# Patient Record
Sex: FEMALE | Race: BLACK OR AFRICAN AMERICAN | Employment: UNEMPLOYED | URBAN - METROPOLITAN AREA
[De-identification: names, ages, dates, MRNs, and addresses within clinical notes are randomized per-mention and may not be internally consistent; named-entity substitution may affect disease eponyms.]

---

## 2018-10-26 ENCOUNTER — APPOINTMENT (OUTPATIENT)
Dept: GENERAL RADIOLOGY | Age: 78
End: 2018-10-26
Attending: EMERGENCY MEDICINE
Payer: COMMERCIAL

## 2018-10-26 ENCOUNTER — HOSPITAL ENCOUNTER (EMERGENCY)
Age: 78
Discharge: HOME | End: 2018-10-26
Attending: EMERGENCY MEDICINE
Payer: COMMERCIAL

## 2018-10-26 VITALS
WEIGHT: 174 LBS | SYSTOLIC BLOOD PRESSURE: 137 MMHG | DIASTOLIC BLOOD PRESSURE: 83 MMHG | HEART RATE: 69 BPM | BODY MASS INDEX: 28.99 KG/M2 | RESPIRATION RATE: 16 BRPM | TEMPERATURE: 97.7 F | OXYGEN SATURATION: 100 % | HEIGHT: 65 IN

## 2018-10-26 DIAGNOSIS — S96.912A MUSCLE STRAIN OF LEFT FOOT, INITIAL ENCOUNTER: ICD-10-CM

## 2018-10-26 DIAGNOSIS — M79.672 ACUTE FOOT PAIN, LEFT: Primary | ICD-10-CM

## 2018-10-26 PROCEDURE — 99282 EMERGENCY DEPT VISIT SF MDM: CPT

## 2018-10-26 PROCEDURE — 73630 X-RAY EXAM OF FOOT: CPT

## 2018-10-26 PROCEDURE — 74011250636 HC RX REV CODE- 250/636: Performed by: EMERGENCY MEDICINE

## 2018-10-26 PROCEDURE — 96372 THER/PROPH/DIAG INJ SC/IM: CPT

## 2018-10-26 RX ORDER — ROPINIROLE 4 MG/1
TABLET, FILM COATED ORAL
COMMUNITY

## 2018-10-26 RX ORDER — OXYCODONE HYDROCHLORIDE 5 MG/1
5 TABLET ORAL
COMMUNITY

## 2018-10-26 RX ORDER — TRIAMTERENE AND HYDROCHLOROTHIAZIDE 37.5; 25 MG/1; MG/1
CAPSULE ORAL DAILY
COMMUNITY

## 2018-10-26 RX ORDER — KETOROLAC TROMETHAMINE 30 MG/ML
15 INJECTION, SOLUTION INTRAMUSCULAR; INTRAVENOUS
Status: COMPLETED | OUTPATIENT
Start: 2018-10-26 | End: 2018-10-26

## 2018-10-26 RX ADMIN — KETOROLAC TROMETHAMINE 15 MG: 30 INJECTION, SOLUTION INTRAMUSCULAR at 12:11

## 2018-10-26 NOTE — PROGRESS NOTES
conducted an initial consultation and Spiritual Assessment for Bruno Ramirez, who is a 78 y.o.,female. Patients Primary Language is: [place]. According to the patients EMR Cheondoism Affiliation is: Unknown. The reason the Patient came to the hospital is: There are no active problems to display for this patient. The  provided the following Interventions: 
Initiated a relationship of care and support. Explored issues of fiona, belief, spirituality and Congregational/ritual needs while hospitalized. Listened empathically. Provided chaplaincy education. Provided information about Spiritual Care Services. Offered prayer and assurance of continued prayers on patient's behalf. Chart reviewed. The following outcomes where achieved: 
Patient shared limited information about both her medical narrative and spiritual journey/beliefs.  confirmed Patient's Cheondoism Affiliation with [facility] . Patient processed feeling about current hospitalization. Patient expressed gratitude for 's visit. Assessment: 
Patient does not have any Congregational/cultural needs that will affect patients preferences in health care. There are no spiritual or Congregational issues which require intervention at this time. Plan: 
Chaplains will continue to follow and will provide pastoral care on an as needed/requested basis.  recommends bedside caregivers page  on duty if patient shows signs of acute spiritual or emotional distress. Chaplain Resident Altagracia Romo Spiritual Care  
[phone]

## 2018-10-26 NOTE — ED NOTES
Leonard Sarah is a 78 y.o. female that was discharged in stable condition. The patients diagnosis, condition and treatment were explained to  patient and aftercare instructions were given. The patient verbalized understanding. Patient armband removed and shredded.

## 2018-10-26 NOTE — DISCHARGE INSTRUCTIONS
Foot Pain: Care Instructions  Your Care Instructions  Foot injuries that cause pain and swelling are fairly common. Almost all sports or home repair projects can cause a misstep that ends up as foot pain. Normal wear and tear, especially as you get older, also can cause foot pain. Most minor foot injuries will heal on their own, and home treatment is usually all you need to do. If you have a severe injury, you may need tests and treatment. Follow-up care is a key part of your treatment and safety. Be sure to make and go to all appointments, and call your doctor if you are having problems. It's also a good idea to know your test results and keep a list of the medicines you take. How can you care for yourself at home? · Take pain medicines exactly as directed. ? If the doctor gave you a prescription medicine for pain, take it as prescribed. ? If you are not taking a prescription pain medicine, ask your doctor if you can take an over-the-counter medicine. · Rest and protect your foot. Take a break from any activity that may cause pain. · Put ice or a cold pack on your foot for 10 to 20 minutes at a time. Put a thin cloth between the ice and your skin. · Prop up the sore foot on a pillow when you ice it or anytime you sit or lie down during the next 3 days. Try to keep it above the level of your heart. This will help reduce swelling. · Your doctor may recommend that you wrap your foot with an elastic bandage. Keep your foot wrapped for as long as your doctor advises. · If your doctor recommends crutches, use them as directed. · Wear roomy footwear. · As soon as pain and swelling end, begin gentle exercises of your foot. Your doctor can tell you which exercises will help. When should you call for help? Call 911 anytime you think you may need emergency care.  For example, call if:    · Your foot turns pale, white, blue, or cold.    Call your doctor now or seek immediate medical care if:    · You cannot move or stand on your foot.     · Your foot looks twisted or out of its normal position.     · Your foot is not stable when you step down.     · You have signs of infection, such as:  ? Increased pain, swelling, warmth, or redness. ? Red streaks leading from the sore area. ? Pus draining from a place on your foot. ? A fever.     · Your foot is numb or tingly.    Watch closely for changes in your health, and be sure to contact your doctor if:    · You do not get better as expected.     · You have bruises from an injury that last longer than 2 weeks. Where can you learn more? Go to http://hcriss-clark.info/. Enter K646 in the search box to learn more about \"Foot Pain: Care Instructions. \"  Current as of: November 29, 2017  Content Version: 11.8  © 1643-9595 [employer]. Care instructions adapted under license by [employer] (which disclaims liability or warranty for this information). If you have questions about a medical condition or this instruction, always ask your healthcare professional. [address] any warranty or liability for your use of this information.

## 2018-10-26 NOTE — ED PROVIDER NOTES
EMERGENCY DEPARTMENT HISTORY AND PHYSICAL EXAM 
 
11:01 AM 
 
 
Date: 10/26/2018 Patient Name: Crow Boateng History of Presenting Illness Chief Complaint Patient presents with  Fall  Toe Pain History Provided By: Patient Chief Complaint: Toe Pain Duration: 5 Days Timing:  Acute Location: Left 5th toe Quality: Pat[place] Rakes Severity: 6 out of 10 Modifying Factors: Took Percocet and temporarily relieved pain Associated Symptoms: denies any other associated signs or symptoms Additional History (Context):11:41 AM Crow Boateng is a 78 y.o. female with h/o breast cancer presents to ED complaining of left 5th toe pain beginning 5 days ago following a mechanical fall. Pt describes pain as sharp and 6/10 in severity. She claims it is \"hard to walk on it\" and that \"it hurts when wiggling\". She took Percocet x 2 hours ago this morning, and sx were temporarily relieved. Pt reports previous knee surgery in June and that she is allergic to Penicillin. She denies any other injury, concerns or symptoms at this time. PCP: None Past History Past Medical History: 
Past Medical History:  
Diagnosis Date  Cancer [facility])   
 left breast  
 Gastrointestinal disorder GERD Past Surgical History: 
Past Surgical History:  
Procedure Laterality Date  ABDOMEN SURGERY PROC UNLISTED    
 liver CT guided cyst drain 2012  BREAST SURGERY PROCEDURE UNLISTED    
 left breast mastectomy 85  
 HX GYN    
 hystectomy 88  
 HX HEENT    
 tonsilectomy 87, glacoma laser 2012  HX ORTHOPAEDIC    
 right arem rotator cuff repair 2010, rt. hand carpal tunnel release  HX ORTHOPAEDIC    
 right knee replacemtn 2018  VASCULAR SURGERY PROCEDURE UNLIST    
 STENT TO RIGHT LEG VEIN Family History: No family history on file. Social History: 
Social History Tobacco Use  Smoking status: Not on file Substance Use Topics  Alcohol use: Not on file  Drug use: Not on file Allergies: Allergies Allergen Reactions  Penicillins Rash Review of Systems Review of Systems Constitutional: Negative for fever. Respiratory: Negative for shortness of breath. Gastrointestinal: Negative for nausea. Genitourinary: Negative for dysuria. Musculoskeletal: Negative for neck pain and neck stiffness. Positive for toe pain Skin: Negative for pallor. Neurological: Negative for dizziness and numbness. Hematological: Does not bruise/bleed easily. All other systems reviewed and are negative. Physical Exam  
 
Visit Vitals /83 (BP 1 Location: Right arm) Pulse 69 Temp 97.7 °F (36.5 °C) Resp 16 Ht 5' 5\" (1.651 m) Wt 78.9 kg (174 lb) SpO2 100% BMI 28.96 kg/m² Physical Exam  
Constitutional: She is oriented to person, place, and time. She appears well-developed and well-nourished. No distress. HENT:  
Head: Normocephalic and atraumatic. Mouth/Throat: Oropharynx is clear and moist.  
Eyes: Conjunctivae and EOM are normal. No scleral icterus. Neck: Normal range of motion. Neck supple. Cardiovascular: Normal rate and intact distal pulses. Pulses: 
     Dorsalis pedis pulses are 2+ on the right side. Capillary refill < 3 seconds Good dorsal pulses Pulmonary/Chest: Effort normal. No respiratory distress. Musculoskeletal: Normal range of motion. She exhibits tenderness. She exhibits no edema or deformity. Left foot: There is tenderness. Left fifth toe tenderness Fifth metatarsal tenderness No obvious deformity in left foot Neurological: She is alert and oriented to person, place, and time. No cranial nerve deficit. She exhibits normal muscle tone. Coordination normal.  
Sensation intact Skin: Skin is warm and dry. No rash noted. She is not diaphoretic. No erythema. No open wound Psychiatric: Thought content normal.  
Nursing note and vitals reviewed. Diagnostic Study Results Labs - No results found for this or any previous visit (from the past 12 hour(s)). Radiologic Studies -  
XR FOOT LT MIN 3 V Final Result Impression:  
 IMPRESSION: 
1.  No acute fracture or dislocation. 2.  Mild to moderate soft tissue swelling in the lateral aspect of the forefoot. 3.  Generalized osteopenia. 4.  Degenerative changes as above. Medical Decision Making I am the first provider for this patient. I reviewed the vital signs, available nursing notes, past medical history, past surgical history, family history and social history. Vital Signs-Reviewed the patient's vital signs. Pulse Oximetry Analysis -  100% on room air Records Reviewed: Nursing Notes and Old Medical Records (Time of Review: 11:01 AM) Provider Notes (Medical Decision Making): MDM Number of Diagnoses or Management Options Acute foot pain, left:  
Muscle strain of left foot, initial encounter:  
Diagnosis management comments: Ddx: Fracture, contusion, strain. x-ray and give pain medicine. Amount and/or Complexity of Data Reviewed Tests in the radiology section of CPT®: ordered and reviewed Tests in the medicine section of CPT®: ordered and reviewed Medications  
ketorolac (TORADOL) injection 15 mg (15 mg IntraMUSCular Given 10/26/18 1211) ED Course: Progress Notes, Reevaluation, and Consults: 
1:04 PM 
No fracture, NV intact pre and post cast shoe. Pt is taking Oxycodone at home. F/U pcp in [facility], may need podiatry referral if not getting better in few days. I discussed this with her. She agrees an understands, she will discuss with her pcp. I have reassessed the patient. I have discussed the workup, results and plan with the patient and patient is in agreement. Patient has no new complaint. .  Patient was discharge in stable condition. Patient was given outpatient follow up.   Patient is to return to emergency department if any new or worsening condition. Diagnosis Clinical Impression: 1. Acute foot pain, left 2. Muscle strain of left foot, initial encounter Disposition: discharged. Follow-up Information Follow up With Specialties Details Why Contact Info Your PCP   Schedule an appointment as soon as possible for a visit in 2 days For Follow-Up [address] [facility] EMERGENCY DEPT Emergency Medicine Go to As needed, If symptoms worsen [id number] [facility] [place] [address] [phone] Medication List  
  
ASK your doctor about these medications   
oxyCODONE IR 5 mg immediate release tablet Commonly known as:  ROXICODONE 
  
rOPINIRole 4 mg Tab TAB Commonly known as:  REQUIP 
  
triamterene-hydroCHLOROthiazide 37.5-25 mg per capsule Commonly known as:  Edyta Barker 
  
  
 
_______________________________ Attestations: 
Scribe Attestation [employer] acting as a scribe for and in the presence of Guido Molina MontevalloDO October 26, 2018 at 12:07 PM 
    
Provider Attestation:     
I personally performed the services described in the documentation, reviewed the documentation, as recorded by the scribe in my presence, and it accurately and completely records my words and actions. October 26, 2018 at 12:07 PM - Myriam Gamboa DO   
_______________________________

## 2021-04-09 ENCOUNTER — HOSPITAL ENCOUNTER (EMERGENCY)
Age: 81
Discharge: HOME | End: 2021-04-09
Attending: EMERGENCY MEDICINE
Payer: MEDICARE

## 2021-04-09 ENCOUNTER — APPOINTMENT (OUTPATIENT)
Dept: GENERAL RADIOLOGY | Age: 81
End: 2021-04-09
Attending: EMERGENCY MEDICINE
Payer: MEDICARE

## 2021-04-09 VITALS
HEIGHT: 62 IN | OXYGEN SATURATION: 100 % | DIASTOLIC BLOOD PRESSURE: 90 MMHG | TEMPERATURE: 98.6 F | SYSTOLIC BLOOD PRESSURE: 137 MMHG | WEIGHT: 178 LBS | HEART RATE: 84 BPM | BODY MASS INDEX: 32.76 KG/M2 | RESPIRATION RATE: 17 BRPM

## 2021-04-09 DIAGNOSIS — S80.02XA CONTUSION OF LEFT KNEE, INITIAL ENCOUNTER: Primary | ICD-10-CM
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
## 2021-04-09 PROCEDURE — 99283 EMERGENCY DEPT VISIT LOW MDM: CPT

## 2021-04-09 PROCEDURE — 73564 X-RAY EXAM KNEE 4 OR MORE: CPT

## 2021-04-09 NOTE — ED PROVIDER NOTES
[facility] EMERGENCY DEPT    Date: 4/9/2021  Patient Name: Courtney Rodriguez    History of Presenting Illness     Chief Complaint   Patient presents with    Knee Pain     68 y.o. female with noted past medical history presents the ED complaining of a trip and fall last evening with left knee pain. Patient reports attempting to walk up the steps, when she missed a step, falling on her left knee. She states she did not have any other injury. Notes having constant moderate pain and swelling to her left knee. She has not taken anything for symptoms today. Denies any head injury, LOC, neck pain, back pain, numbness/weakness, or any other symptoms at this time. Patient denies any other associated signs or symptoms. Patient denies any other complaints. Nursing notes regarding the HPI and triage nursing notes were reviewed. Prior medical records were reviewed. Current Outpatient Medications   Medication Sig Dispense Refill    triamterene-hydroCHLOROthiazide (DYAZIDE) 37.5-25 mg per capsule Take  by mouth daily.  rOPINIRole (REQUIP) 4 mg tab TAB Take  by mouth.  oxyCODONE IR (ROXICODONE) 5 mg immediate release tablet Take 5 mg by mouth every four (4) hours as needed for Pain.          Past History     Past Medical History:  Past Medical History:   Diagnosis Date    Cancer ([address]     left breast    Gastrointestinal disorder     GERD       Past Surgical History:  Past Surgical History:   Procedure Laterality Date    HX GYN      hystectomy 82    HX HEENT      tonsilectomy 87, glacoma laser 2012    HX ORTHOPAEDIC      right arem rotator cuff repair 2010, rt. hand carpal tunnel release    HX ORTHOPAEDIC      right knee replacemtn 2018    GA ABDOMEN SURGERY PROC UNLISTED      liver CT guided cyst drain 2012    GA BREAST SURGERY PROCEDURE UNLISTED      left breast mastectomy 85    VASCULAR SURGERY PROCEDURE UNLIST      STENT TO RIGHT LEG VEIN       Family History:  History reviewed. No pertinent family history. Social History:  Social History     Tobacco Use    Smoking status: Never Smoker    Smokeless tobacco: Never Used   Substance Use Topics    Alcohol use: Not on file    Drug use: Not on file       Allergies: Allergies   Allergen Reactions    Penicillins Rash       Patient's primary care provider (as noted in EPIC):  None    Review of Systems   Constitutional:  Denies malaise, fever, chills. Head:  Denies injury. Face:  Denies injury or pain. Neck:  Denies injury or pain. Chest:  Denies injury. Cardiac:  Denies chest pain  Respiratory:  Denies shortness of breath. GI/ABD:  Denies abd pain. Back:  Denies injury or pain. Pelvis:  Denies injury or pain. Extremity/MS: + left knee pain/swelling. Neuro:  Denies headache, LOC, dizziness, neurologic symptoms/deficits/paresthesias. Skin: Denies injury, rash, itching or skin changes. All other systems negative as reviewed. Visit Vitals  BP (!) 137/90 (BP 1 Location: Left upper arm, BP Patient Position: At rest)   Pulse 84   Temp 98.6 °F (37 °C)   Resp 17   Ht 5' 2\" (1.575 m)   Wt 80.7 kg (178 lb)   SpO2 100%   BMI 32.56 kg/m²       PHYSICAL EXAM:    CONSTITUTIONAL: Alert, in no apparent distress; well-developed; well-nourished. HEAD:  Normocephalic, atraumatic. No Battles sign. No Raccoons eyes. EYES:  EOM's intact. Normal conjunctiva. Anicteric sclera. ENTM: Nose: no rhinorrhea; Oropharynx:  mucous membranes moist  Neck:  Supple  RESP: Chest clear, equal breath sounds. CARDIOVASCULAR:  Regular rate and rhythm. No murmurs, rubs, or gallops. BACK:  No TLS vertebral bony point tenderness or step-off. UPPER EXT:  Normal inspection  LOWER EXT: Left knee with edema and mild TTP, pain with ROM; NVI distally with 5/5 strength. No laxity or pain on ligamentous testing. NEURO: Grossly normal motor and sensation. SKIN: No rashes; Normal for age and stage.   PSYCH:  Alert and oriented, normal affect. DIFFERENTIAL DIAGNOSES/ MEDICAL DECISION MAKING:  Contusion, hematoma, muscle strain/ sprain, ligamentous strain/ sprain, ligamentous tear/ disruption or a combination of the above. ED COURSE:      Xr Knee Lt Min 4 V    Result Date: 4/9/2021  History: Pain status post fall. COMPARISON: None. TECHNIQUE: 4 views left knee. Findings/impression: Decreased bone mineral density. Moderate tricompartment osteoarthritis. No definite acute fracture given limitations. No large effusion. Question mild patellar tendon thickening. IMPRESSION AND MEDICAL DECISION MAKING:  Pt has a contusion of her left knee. No other injury from the fall. She will RICE, take tylenol at home and f/u with ortho. Diagnosis:   1. Contusion of left knee, initial encounter    2. Fall, initial encounter      Dispo: Discharge    Follow-up Information     Follow up With Specialties Details Why [address] [facility] Surgery In 3 days  [address] [address]  [phone] [address] [place] [address] EMERGENCY DEPT Emergency Medicine  If symptoms worsen [address] [place] [address]  [phone]          Discharge Medication List as of 4/9/2021 12:55 PM      CONTINUE these medications which have NOT CHANGED    Details   triamterene-hydroCHLOROthiazide (DYAZIDE) 37.5-25 mg per capsule Take  by mouth daily. , Historical Med      rOPINIRole (REQUIP) 4 mg tab TAB Take  by mouth., Historical Med      oxyCODONE IR (ROXICODONE) 5 mg immediate release tablet Take 5 mg by mouth every four (4) hours as needed for Pain., Historical Med           YARY Rao

## 2025-05-01 ENCOUNTER — HOSPITAL ENCOUNTER (OUTPATIENT)
Facility: HOSPITAL | Age: 85
Setting detail: RECURRING SERIES
Discharge: HOME | End: 2025-05-04
Payer: MEDICARE

## 2025-05-01 PROCEDURE — 97161 PT EVAL LOW COMPLEX 20 MIN: CPT

## 2025-05-01 NOTE — PROGRESS NOTES
PT/OT DAILY TREATMENT NOTE/SHOULDER EVAL 10-18      Patient Name: Daja Pearl    Date: 2025    : 1940  Insurance: Payor: HU[employer]NA MEDICARE / Plan: HU[employer]NA CHOICE-PPO MEDICARE / Product Type: *No Product type* /      Patient  verified no     Visit #   Current / Total 1 36   Time   In / Out 230 310     TREATMENT AREA =  Other specified postprocedural states [Z98.890]      SUBJECTIVE  Pain Level (0-10 scale): /10  []constant []intermittent []improving []worsening []no change since onset    Any medication changes, allergies to medications, adverse drug reactions, diagnosis change, or new procedure performed?: [x] No    [] Yes   Subjective functional status/changes:     PLOF: functionally independent, lives independently, gardens, cooks, and fishes.   Limitations to PLOF: Pt currently unable to use left arm for any AROM, in sling, required assist for dressing and all IADLS.   Mechanism of Injury: 25 Left RTC repair with Subacromial decompression    Current symptoms/Complaints: 1/10 at best with rest; 7/10 at worst with movement; pt reports they are unable to sleep through the night secondary to pain  Previous Treatment/Compliance: NA  PMHx/Surgical Hx: Breast Cancer with lumpectomy and lumpectomy, removal OA, HBP, Right RTC repair >10 years ago, hx of DVT.   Work Hx: Retired   Living Situation: Live alone; staying with daughter to recover.   Pt Goals: \"Pt get better, use arm like normal\"  Barriers: []pain []financial []time []transportation []other  Motivation: Good  Substance use: []Alcohol []Tobacco []other:   Cognition: A & O x 3        OBJECTIVE      16 min []Eval - untimed                          Therapeutic Procedures:  Tx Min Billable or 1:1 Min (if diff from Tx Min) Procedure, Rationale, Specifics   7  [id number] Therapeutic Exercise (timed):  increase ROM, strength, coordination, balance, and proprioception to improve patient's ability to progress to PLOF and address remaining functional

## 2025-05-01 NOTE — PROGRESS NOTES
[facility]  [address] [facility] [address] [place] [address] Ph:[phone] Fx: [phone]    PLAN OF CARE/ Statement of Necessity for Physical Therapy Services           Patient name: Daja Pearl Start of Care: 2025   Referral source: Rafael Sheikh MD : 1940    Medical Diagnosis: Other specified postprocedural states Onset Date: 25   Treatment Diagnosis: M25.512  LEFT SHOULDER PAIN                                     Prior Hospitalization: see medical history Provider#: [id number]     Comorbidities:  Breast Cancer with lumpectomy and lumpectomy, removal OA, HBP, Right RTC repair >10 years ago, hx of DVT.     Prior Level of Function: functionally independent, lives independently, gardens, cooks, and fishes.     The Plan of Care and following information is based on the information from the initial evaluation.    Assessment / key information:  Pt is a 85 yo female who presents to [facility] with c/o left Shoulder pain. Patient is s/p L RTC repair with subacromial decompression on 25 for complete RTC tear.  Patient demonstrates decreased PROM and increased pain resulting in decreased functional mobility. Unable to assess strength nor AROM at this time secondary to post op precautions. Pt educated on precautions and would benefit from PT to improve functional mobility and decrease risk of re injury.      Evaluation Complexity:  History:  LOW Complexity : Zero comorbidities / personal factors that will impact the outcome / POC; Examination:  LOW Complexity : 1-2 Standardized tests and measures addressing body structure, function, activity limitation and / or participation in recreation  ;Presentation:  LOW Complexity : Stable, uncomplicated  ;Clinical Decision Making:  QuickDASH: Disability Arm, Shoulder, Hand = 90  % ; (> 61% Severe Disability) = HIGH Complexity FOTO score = an established functional score where 100 = no

## 2025-05-02 ENCOUNTER — APPOINTMENT (OUTPATIENT)
Facility: HOSPITAL | Age: 85
End: 2025-05-02
Payer: MEDICARE

## 2025-05-04 ENCOUNTER — HOSPITAL ENCOUNTER (EMERGENCY)
Facility: HOSPITAL | Age: 85
Discharge: HOME | End: 2025-05-04
Attending: EMERGENCY MEDICINE
Payer: MEDICARE

## 2025-05-04 ENCOUNTER — APPOINTMENT (OUTPATIENT)
Facility: HOSPITAL | Age: 85
End: 2025-05-04
Payer: MEDICARE

## 2025-05-04 VITALS
BODY MASS INDEX: 30.56 KG/M2 | OXYGEN SATURATION: 98 % | WEIGHT: 179 LBS | TEMPERATURE: 97.9 F | SYSTOLIC BLOOD PRESSURE: 142 MMHG | DIASTOLIC BLOOD PRESSURE: 93 MMHG | HEART RATE: 79 BPM | RESPIRATION RATE: 20 BRPM | HEIGHT: 64 IN

## 2025-05-04 DIAGNOSIS — M72.2 PLANTAR FASCIITIS OF LEFT FOOT: ICD-10-CM

## 2025-05-04 DIAGNOSIS — M77.32 CALCANEAL SPUR OF FOOT, LEFT: Primary | ICD-10-CM

## 2025-05-04 PROCEDURE — 6370000000 HC RX 637 (ALT 250 FOR IP): Performed by: EMERGENCY MEDICINE

## 2025-05-04 PROCEDURE — 73630 X-RAY EXAM OF FOOT: CPT

## 2025-05-04 PROCEDURE — 99284 EMERGENCY DEPT VISIT MOD MDM: CPT

## 2025-05-04 RX ORDER — ONDANSETRON 4 MG/1
4 TABLET, FILM COATED ORAL EVERY 8 HOURS PRN
COMMUNITY

## 2025-05-04 RX ORDER — TRAMADOL HYDROCHLORIDE 50 MG/1
50 TABLET ORAL
Status: COMPLETED | OUTPATIENT
Start: 2025-05-04 | End: 2025-05-04

## 2025-05-04 RX ORDER — METHYLPREDNISOLONE 4 MG/1
TABLET ORAL
Qty: 1 KIT | Refills: 0 | Status: SHIPPED | OUTPATIENT
Start: 2025-05-04 | End: 2025-05-04

## 2025-05-04 RX ORDER — METHYLPREDNISOLONE 4 MG/1
TABLET ORAL
Qty: 1 KIT | Refills: 0 | Status: SHIPPED | OUTPATIENT
Start: 2025-05-04 | End: 2025-05-10

## 2025-05-04 RX ORDER — TRAMADOL HYDROCHLORIDE 50 MG/1
50 TABLET ORAL EVERY 6 HOURS PRN
Qty: 12 TABLET | Refills: 0 | Status: SHIPPED | OUTPATIENT
Start: 2025-05-04 | End: 2025-05-07

## 2025-05-04 RX ORDER — GABAPENTIN ENACARBIL 600 MG/1
1 TABLET, EXTENDED RELEASE ORAL
COMMUNITY

## 2025-05-04 RX ORDER — TRAMADOL HYDROCHLORIDE 50 MG/1
50 TABLET ORAL EVERY 6 HOURS PRN
Qty: 12 TABLET | Refills: 0 | Status: SHIPPED | OUTPATIENT
Start: 2025-05-04 | End: 2025-05-04

## 2025-05-04 RX ADMIN — TRAMADOL HYDROCHLORIDE 50 MG: 50 TABLET, COATED ORAL at 09:44

## 2025-05-04 ASSESSMENT — PAIN SCALES - GENERAL
PAINLEVEL_OUTOF10: 2
PAINLEVEL_OUTOF10: 4
PAINLEVEL_OUTOF10: 8

## 2025-05-04 ASSESSMENT — PAIN - FUNCTIONAL ASSESSMENT
PAIN_FUNCTIONAL_ASSESSMENT: 0-10
PAIN_FUNCTIONAL_ASSESSMENT: PREVENTS OR INTERFERES SOME ACTIVE ACTIVITIES AND ADLS

## 2025-05-04 ASSESSMENT — PAIN DESCRIPTION - ORIENTATION: ORIENTATION: LEFT

## 2025-05-04 ASSESSMENT — LIFESTYLE VARIABLES
HOW MANY STANDARD DRINKS CONTAINING ALCOHOL DO YOU HAVE ON A TYPICAL DAY: PATIENT DOES NOT DRINK
HOW OFTEN DO YOU HAVE A DRINK CONTAINING ALCOHOL: NEVER

## 2025-05-04 ASSESSMENT — PAIN DESCRIPTION - LOCATION
LOCATION: FOOT
LOCATION: FOOT

## 2025-05-04 NOTE — ED PROVIDER NOTES
rotator cuff surgery rehabilitation       Patient was given the following medications:  Medications   traMADol (ULTRAM) tablet 50 mg (50 mg Oral Given 5/4/25 0944)       CONSULTS: (Who and What was discussed)  None    Chronic Conditions: See HPI    Social Determinants affecting Dx or Tx: None    Records Reviewed (source and summary of external notes): Nursing Notes, Old Medical Records, Previous Radiology Studies, and Previous Laboratory Studies    @procdoc@    Critical Care Time: None    SCREENING TOOLS:  None    CLINICAL MANAGEMENT TOOLS:  Not Applicable    Positive and negative test results were discussed. My clinical assessment and recommendations were discussed. They agree with the plan of discharge. Return precautions were discussed. All questions were answered and they are in agreement with plan.    10:36 AM Upon re-evaluation the patient's symptoms have improved. Pt has non-toxic appearance and condition is stable for discharge. She was informed of her results, instructed to f/u with her PCP and return to the ED upon worsening of symptoms. All questions and concerns were addressed.        Is this patient to be included in the SEP-1 core measure? No Exclusion criteria - the patient is NOT to be included for SEP-1 Core Measure due to: Infection is not suspected    MEDICATIONS ADMINISTERED IN THE ED:  Medications   traMADol (ULTRAM) tablet 50 mg (50 mg Oral Given 5/4/25 0944)            None    Critical Care: None    Diagnosis and Disposition   Diagnosis:   1. Calcaneal spur of foot, left    2. Plantar fasciitis of left foot          Disposition:    DISPOSITION Decision To Discharge 05/04/2025 10:23:48 AM         [unfilled]      Dragon Disclaimer     Please note that this dictation was completed with baseclick, the computer voice recognition software. Quite often unanticipated grammatical, syntax, homophones, and other interpretive errors are inadvertently transcribed by the computer software. Please

## 2025-05-04 NOTE — ED TRIAGE NOTES
Patient presents to ER with c/o left foot pain for 1 week with applying pressure to walk.    Continue to Observe Discharge

## 2025-05-04 NOTE — ED NOTES
Discharge instructions reviewed with patient.  Patient verbalized understanding.  Patient advised to follow up with provider as directed on discharge instructions. Patient reported a tolerable level of pain upon discharge.  Patient denies questions, needs or concerns at this time.  Patient verbalized understanding. Patient ambulated out of the ER with a steady gait.         Patient request for pharmacy change to [facility]. Changed in the system. Provider aware.

## 2025-05-05 ENCOUNTER — HOSPITAL ENCOUNTER (OUTPATIENT)
Facility: HOSPITAL | Age: 85
Setting detail: RECURRING SERIES
Discharge: HOME | End: 2025-05-08
Payer: MEDICARE

## 2025-05-05 PROCEDURE — 97140 MANUAL THERAPY 1/> REGIONS: CPT

## 2025-05-05 PROCEDURE — 97110 THERAPEUTIC EXERCISES: CPT

## 2025-05-05 PROCEDURE — 97016 VASOPNEUMATIC DEVICE THERAPY: CPT

## 2025-05-05 NOTE — PROGRESS NOTES
Eval Status: Unable to assess        PLAN  Yes  Continue plan of care  []  Upgrade activities as tolerated  []  Discharge due to :  []  Other:    Paola Pierce PT    5/5/2025    2:44 PM    Future Appointments   Date Time Provider Department Center   5/12/2025 12:30 PM Denis Alford, PTA [facility]   5/19/2025 11:10 AM Paola Pierce, PT [facility]   5/21/2025 11:10 AM Norah Powers, PT [facility]   5/28/2025 11:10 AM Norah Powers, PT [facility]   5/30/2025 12:30 PM Paola Pierce, PT [facility]   6/2/2025 11:10 AM Paola Pierce, PT [facility]   6/4/2025 11:10 AM Norah Powers, PT [facility]

## 2025-05-12 ENCOUNTER — HOSPITAL ENCOUNTER (OUTPATIENT)
Facility: HOSPITAL | Age: 85
Setting detail: RECURRING SERIES
Discharge: HOME | End: 2025-05-15
Payer: MEDICARE

## 2025-05-12 PROCEDURE — 97140 MANUAL THERAPY 1/> REGIONS: CPT

## 2025-05-12 PROCEDURE — 97016 VASOPNEUMATIC DEVICE THERAPY: CPT

## 2025-05-12 PROCEDURE — 97112 NEUROMUSCULAR REEDUCATION: CPT

## 2025-05-12 NOTE — PROGRESS NOTES
PHYSICAL / OCCUPATIONAL THERAPY - DAILY TREATMENT NOTE     Patient Name: Daja Pearl    Date: 2025    : 1940  Insurance: Payor: [employer]ICARE / Plan: [employer] CHOICE-O MEDICARE / Product Type: *No Product type* /      Patient  verified Yes     Visit #   Current / Total 3 36   Time   In / Out 12:30 1:15   Pain   In / Out 2/10 0/10   Subjective Functional Status/Changes: Pt reports no new complaints    Changes to:  Allergies, Med Hx, Sx Hx?   no       TREATMENT AREA =  Other specified postprocedural states [Z98.890]  Pain in left shoulder [M25.512]    If an interpreting service is utilized for treatment of this patient, the contents of this document represent the material reviewed with the patient via the .     OBJECTIVE    Modalities Rationale:     decrease inflammation and decrease pain to improve patient's ability to progress to PLOF and address remaining functional goals.    10 min [x]  Vasopneumatic Device, press/temp: Low/low   If using vaso (only need to measure limb vaso being performed on)      pre-treatment girth : 32 cm      post-treatment girth : 31.5 cm      measured at (landmark location) :  deltoid tuberosity    Skin assessment post-treatment (if applicable):    []  intact    []  redness- no adverse reaction                 []redness - adverse reaction:         Therapeutic Procedures:  Tx Min Billable or 1:1 Min (if diff from Tx Min) Procedure, Rationale, Specifics   23  34568 Therapeutic Exercise (timed):  increase ROM, strength, coordination, balance, and proprioception to improve patient's ability to progress to PLOF and address remaining functional goals. (see flow sheet as applicable)    Details if applicable:        70637 Manual Therapy (timed):  decrease pain, increase ROM, and increase tissue extensibility to improve patient's ability to progress to PLOF and address remaining functional goals.  The manual therapy interventions were performed at a

## 2025-05-19 ENCOUNTER — HOSPITAL ENCOUNTER (OUTPATIENT)
Facility: HOSPITAL | Age: 85
Setting detail: RECURRING SERIES
Discharge: HOME | End: 2025-05-22
Payer: MEDICARE

## 2025-05-19 PROCEDURE — 97140 MANUAL THERAPY 1/> REGIONS: CPT

## 2025-05-19 PROCEDURE — 97110 THERAPEUTIC EXERCISES: CPT

## 2025-05-19 NOTE — PROGRESS NOTES
PHYSICAL / OCCUPATIONAL THERAPY - DAILY TREATMENT NOTE     Patient Name: Daja Pearl    Date: 2025    : 1940  Insurance: Payor: [employer] MEDICARE / Plan: [employer] CHOICE-PPO MEDICARE / Product Type: *No Product type* /      Patient  verified Yes     Visit #   Current / Total 4 36   Time   In / Out 1110 1150   Pain   In / Out 2 0   Subjective Functional Status/Changes: Patient complains of pain/ Stiffness in Left Shoulder today.Patient also reports that her left Shoulder Pain goes down to whole L upper arm today    Changes to:  Allergies, Med Hx, Sx Hx?   no       TREATMENT AREA =  Other specified postprocedural states [Z98.890]  Pain in left shoulder [M25.512]    If an interpreting service is utilized for treatment of this patient, the contents of this document represent the material reviewed with the patient via the .     OBJECTIVE          Therapeutic Procedures:  Tx Min Billable or 1:1 Min (if diff from Tx Min) Procedure, Rationale, Specifics   28  12269 Therapeutic Exercise (timed):  increase ROM, strength, coordination, balance, and proprioception to improve patient's ability to progress to PLOF and address remaining functional goals. (see flow sheet as applicable)    Details if applicable:           Details if applicable:     12  06137 Manual Therapy (timed):  decrease pain, increase ROM, increase tissue extensibility, and decrease trigger points to improve patient's ability to progress to PLOF and address remaining functional goals.  The manual therapy interventions were performed at a separate and distinct time from the therapeutic activities interventions . Details:  Gentle STM to Left  shoulder ; Gentle PROM to  Left  Shoulder       Details if applicable:           Details if applicable:            Details if applicable:     40  [facility] Totals Reminder: bill using total billable min of TIMED therapeutic procedures (example: do not include dry needle or estim unattended, both

## 2025-05-21 ENCOUNTER — HOSPITAL ENCOUNTER (OUTPATIENT)
Facility: HOSPITAL | Age: 85
Setting detail: RECURRING SERIES
Discharge: HOME | End: 2025-05-24
Payer: MEDICARE

## 2025-05-21 PROCEDURE — 97110 THERAPEUTIC EXERCISES: CPT

## 2025-05-21 NOTE — PROGRESS NOTES
PHYSICAL / OCCUPATIONAL THERAPY - DAILY TREATMENT NOTE     Patient Name: Daja Pearl    Date: 2025    : 1940  Insurance: Payor: [employer] MEDICARE / Plan: [employer] CHOICE-PPO MEDICARE / Product Type: *No Product type* /      Patient  verified Yes     Visit #   Current / Total 5 36   Time   In / Out 1110 1148   Pain   In / Out 0 0   Subjective Functional Status/Changes: Pt reports she's having trouble sleeping at night secondary to sling.   Changes to:  Allergies, Med Hx, Sx Hx?   no       TREATMENT AREA =  Other specified postprocedural states [Z98.890]  Pain in left shoulder [M25.512]    If an interpreting service is utilized for treatment of this patient, the contents of this document represent the material reviewed with the patient via the .     OBJECTIVE      Therapeutic Procedures:  Tx Min Billable or 1:1 Min (if diff from Tx Min) Procedure, Rationale, Specifics   38  40880 Therapeutic Exercise (timed):  increase ROM, strength, coordination, balance, and proprioception to improve patient's ability to progress to PLOF and address remaining functional goals. (see flow sheet as applicable)    Details if applicable:  PROM all direction ER <30 deg.      38  [facility] Totals Reminder: bill using total billable min of TIMED therapeutic procedures (example: do not include dry needle or estim unattended, both untimed codes, in totals to left)  8-22 min = 1 unit; 23-37 min = 2 units; 38-52 min = 3 units; 53-67 min = 4 units; 68-82 min = 5 units   Total Total     TOTAL TREATMENT TIME:        38     Charge Capture    [x]  Patient Education billed concurrently with other procedures   [x] Review HEP    [] Progressed/Changed HEP, detail:    [] Other detail:       Objective Information/Functional Measures/Assessment    Pt is currently 4wk post op with good tolerance to addition of table slides today without shoulder pain. To hold pulley exercises until 5-6wk secondary to protocol. Pt increased

## 2025-05-28 ENCOUNTER — HOSPITAL ENCOUNTER (OUTPATIENT)
Facility: HOSPITAL | Age: 85
Setting detail: RECURRING SERIES
Discharge: HOME | End: 2025-05-31
Payer: MEDICARE

## 2025-05-28 PROCEDURE — 97110 THERAPEUTIC EXERCISES: CPT

## 2025-05-28 NOTE — PROGRESS NOTES
PHYSICAL / OCCUPATIONAL THERAPY - DAILY TREATMENT NOTE     Patient Name: Daja Pearl    Date: 2025    : 1940  Insurance: Payor: [employer] MEDICARE / Plan: [employer] CHOICE-PPO MEDICARE / Product Type: *No Product type* /      Patient  verified Yes     Visit #   Current / Total 6 36   Time   In / Out 1107 1148   Pain   In / Out 0 0   Subjective Functional Status/Changes: Pt reports she has had minimal shoulder pain.    Changes to:  Allergies, Med Hx, Sx Hx?   no       TREATMENT AREA =  Other specified postprocedural states [Z98.890]  Pain in left shoulder [M25.512]    If an interpreting service is utilized for treatment of this patient, the contents of this document represent the material reviewed with the patient via the .     OBJECTIVE        Therapeutic Procedures:  Tx Min Billable or 1:1 Min (if diff from Tx Min) Procedure, Rationale, Specifics   41  00037 Therapeutic Exercise (timed):  increase ROM, strength, coordination, balance, and proprioception to improve patient's ability to progress to PLOF and address remaining functional goals. (see flow sheet as applicable)     Details if applicable:  Gentle PROM all direction ER <30 deg.        41  [facility] Totals Reminder: bill using total billable min of TIMED therapeutic procedures (example: do not include dry needle or estim unattended, both untimed codes, in totals to left)  8-22 min = 1 unit; 23-37 min = 2 units; 38-52 min = 3 units; 53-67 min = 4 units; 68-82 min = 5 units   Total Total     TOTAL TREATMENT TIME:        41     Charge Capture    [x]  Patient Education billed concurrently with other procedures   [x] Review HEP    [] Progressed/Changed HEP, detail:    [] Other detail:       Objective Information/Functional Measures/Assessment    Pt continues to reports no pain upon arrival and minimal tightness/pain with PROM. Min VC required to address muscle guarding. Pt progressing well with ROM and all PROM completed to mild stretch

## 2025-05-30 ENCOUNTER — HOSPITAL ENCOUNTER (OUTPATIENT)
Facility: HOSPITAL | Age: 85
Setting detail: RECURRING SERIES
End: 2025-05-30
Payer: MEDICARE

## 2025-05-30 PROCEDURE — 97110 THERAPEUTIC EXERCISES: CPT

## 2025-06-02 ENCOUNTER — HOSPITAL ENCOUNTER (OUTPATIENT)
Facility: HOSPITAL | Age: 85
Setting detail: RECURRING SERIES
Discharge: HOME | End: 2025-06-05
Payer: MEDICARE

## 2025-06-02 PROCEDURE — 97140 MANUAL THERAPY 1/> REGIONS: CPT

## 2025-06-02 PROCEDURE — 97110 THERAPEUTIC EXERCISES: CPT

## 2025-06-02 NOTE — PROGRESS NOTES
PHYSICAL / OCCUPATIONAL THERAPY - DAILY TREATMENT NOTE     Patient Name: Daja Pearl    Date: 2025    : 1940  Insurance: Payor: [employer] MEDICARE / Plan: HU[employer] CHOICE-PPO MEDICARE / Product Type: *No Product type* /  -    Patient  verified Yes     Visit #   Current / Total 8 36   Time   In / Out 1110 1150   Pain   In / Out 0 0   Subjective Functional Status/Changes: Patient complains of no pain in Left Shoulder but pt reports stiffness in Left Shoulder today    Changes to:  Allergies, Med Hx, Sx Hx?   no       TREATMENT AREA =  Other specified postprocedural states [Z98.890]  Pain in left shoulder [M25.512]    If an interpreting service is utilized for treatment of this patient, the contents of this document represent the material reviewed with the patient via the .     OBJECTIVE          Therapeutic Procedures:  Tx Min Billable or 1:1 Min (if diff from Tx Min) Procedure, Rationale, Specifics   30  91986 Therapeutic Exercise (timed):  increase ROM, strength, coordination, balance, and proprioception to improve patient's ability to progress to PLOF and address remaining functional goals. (see flow sheet as applicable)    Details if applicable:            10  18596 Manual Therapy (timed):  increase ROM, increase tissue extensibility, decrease trigger points, and stiffness. to improve patient's ability to progress to PLOF and address remaining functional goals.  The manual therapy interventions were performed at a separate and distinct time from the therapeutic activities interventions . (see flow sheet as applicable)    Details if applicable:  Gentle STM to Left shoulder ; Gentle PROM to Left Shoulder           Details if applicable:            Details if applicable:     40  [facility] Totals Reminder: bill using total billable min of TIMED therapeutic procedures (example: do not include dry needle or estim unattended, both untimed codes, in totals to left)  8-22 min = 1 unit; 23-37 min = 2

## 2025-06-04 ENCOUNTER — HOSPITAL ENCOUNTER (OUTPATIENT)
Facility: HOSPITAL | Age: 85
Setting detail: RECURRING SERIES
Discharge: HOME | End: 2025-06-07
Payer: MEDICARE

## 2025-06-04 PROCEDURE — 97110 THERAPEUTIC EXERCISES: CPT

## 2025-06-04 NOTE — PROGRESS NOTES
PT DISCHARGE DAILY NOTE AND SUMMARY 3-25    If signature is requested please return to:    [facility]  Fax: [phone]    Date:2025  Patient name: Daja Pearl Start of Care: 2025   Referral source: Rafael Sheikh MD : 1940               Medical Diagnosis: Other specified postprocedural states Onset Date: 25   Treatment Diagnosis: M25.512  LEFT SHOULDER PAIN                                     Prior Hospitalization: see medical history Provider#: [id number]      Comorbidities:  Breast Cancer with lumpectomy and lumpectomy, removal OA, HBP, Right RTC repair >10 years ago, hx of DVT.      Prior Level of Function: functionally independent, lives independently, gardens, cooks, and fishes  Insurance: Payor: [employer] MEDICARE / Plan: [employer] CHOICE-PPO MEDICARE / Product Type: *No Product type* /      Visits from Start of Care: 9 Missed Visits: 0    Medicare: Reporting Period (date from last assessment to current assessment): 25 -25    Patient  verified no     Visit #   Current / Total 9 24   Time   In / Out 1110 1150   Pain   In / Out 0 0   Subjective Functional Status/Changes: Pt reports she is moving back home tomorrow and will continue PT there.      TREATMENT AREA =  Other specified postprocedural states  Pain in left shoulder      OBJECTIVE      Therapeutic Procedures:    Tx Min Billable or 1:1 Min (if diff from Tx Min) Procedure, Rationale, Specifics   40  44338 Therapeutic Exercise (timed):  increase ROM, strength, coordination, balance, and proprioception to improve patient's ability to progress to PLOF and address remaining functional goals. (see flow sheet as applicable)     Details if applicable:   Gentle PROM all direction ER <30 deg             Details if applicable:            Details if applicable:            Details if applicable:            Details if applicable:     40  [facility] Totals Reminder: bill using total billable min of TIMED therapeutic